# Patient Record
Sex: FEMALE | Race: WHITE | NOT HISPANIC OR LATINO | Employment: FULL TIME | URBAN - METROPOLITAN AREA
[De-identification: names, ages, dates, MRNs, and addresses within clinical notes are randomized per-mention and may not be internally consistent; named-entity substitution may affect disease eponyms.]

---

## 2018-05-14 DIAGNOSIS — E03.9 HYPOTHYROIDISM, UNSPECIFIED TYPE: Primary | ICD-10-CM

## 2018-05-14 RX ORDER — LEVOTHYROXINE SODIUM 137 MCG
TABLET ORAL
Qty: 83 TABLET | Refills: 5 | Status: SHIPPED | OUTPATIENT
Start: 2018-05-14 | End: 2019-06-19 | Stop reason: SDUPTHER

## 2018-06-19 ENCOUNTER — OFFICE VISIT (OUTPATIENT)
Dept: ENDOCRINOLOGY | Facility: HOSPITAL | Age: 48
End: 2018-06-19
Payer: COMMERCIAL

## 2018-06-19 VITALS
DIASTOLIC BLOOD PRESSURE: 70 MMHG | HEART RATE: 70 BPM | HEIGHT: 65 IN | SYSTOLIC BLOOD PRESSURE: 116 MMHG | BODY MASS INDEX: 28.26 KG/M2 | WEIGHT: 169.6 LBS

## 2018-06-19 DIAGNOSIS — E03.8 HYPOTHYROIDISM DUE TO HASHIMOTO'S THYROIDITIS: Primary | ICD-10-CM

## 2018-06-19 DIAGNOSIS — E06.3 HYPOTHYROIDISM DUE TO HASHIMOTO'S THYROIDITIS: Primary | ICD-10-CM

## 2018-06-19 DIAGNOSIS — E04.2 MULTIPLE THYROID NODULES: ICD-10-CM

## 2018-06-19 PROCEDURE — 99203 OFFICE O/P NEW LOW 30 MIN: CPT | Performed by: INTERNAL MEDICINE

## 2018-06-19 NOTE — PATIENT INSTRUCTIONS
Blood work was normal for the thyroid in march 2018  Given increased fatigue, let's repeat blood work now  Call about 1 week after blood work if we don't call you first   Follow up in 1 year with blood work and thyroid ultrasound

## 2018-06-19 NOTE — PROGRESS NOTES
6/19/2018    Assessment/Plan      Diagnoses and all orders for this visit:    Hypothyroidism due to Hashimoto's thyroiditis  -     TSH, 3rd generation Lab Collect  -     T4, free Lab Collect  -     TSH, 3rd generation Lab Collect; Future  -     T4, free Lab Collect; Future  -     Thyroid Antibodies Panel Lab Collect; Future  -     US thyroid; Future    Multiple thyroid nodules  -     TSH, 3rd generation Lab Collect  -     T4, free Lab Collect  -     TSH, 3rd generation Lab Collect; Future  -     T4, free Lab Collect; Future  -     Thyroid Antibodies Panel Lab Collect; Future  -     US thyroid; Future        Assessment/Plan:  1  Hypothyroidism due to Hashimoto's thyroiditis  Blood work done in March did show she was biochemically euthyroid  Unfortunately, she started to have more fatigue and weight struggles  I will recheck a TSH and free T4 now and she will call about a week afterwards for the results at which point we will make adjustments to her thyroid hormone dosage as needed  For now, she will continue the same Synthroid 137 mcg daily  2   Thyroid nodules  She has not had a recent ultrasound and she has had nodules biopsied in the past   I will have her repeat her thyroid ultrasound next year  I have asked her to follow up in 1 year with preceding TSH, free T4, thyroid antibody panel, and thyroid ultrasound  CC: thyroid consult    History of Present Illness     HPI: eSa Murray is a 49y o  year old female with history of hypothyroidism due to hashimoto's thyroiditis  This was diagnosed about 15 years ago and she is on Synthroid brand 137 mcg daily  She has a history of thyroid nodules biopsied several times in the past with the last ultrasound 2 years ago  She tends to be on the cold side  She has no feelings of being hot and sweaty, palpitation, tremors, diarrhea, or constipation  She denies insomnia, anxiety, or depression  She has dry skin and brittle nails, but no hair loss    Weight has reportedly been increasing despite watching portions and exercising  She has noted she is more fatigued recently  Menstrual cycles occur on a regular monthly basis but she is starting to get some hot flashes before her menses  She has no diplopia  She has no difficulties with swallowing or compressive thyroid symptoms  She denies any history of head or neck irradiation  Review of Systems   Constitutional: Positive for fatigue  Negative for unexpected weight change  More fatigue within the last few months  Weight keeps climbing despite diet/portion control and exercise  HENT: Negative for hearing loss, tinnitus and trouble swallowing  No history of XRT to head or neck  Eyes: Negative for visual disturbance  No diplopia  Respiratory: Negative for chest tightness and shortness of breath  Cardiovascular: Negative for chest pain, palpitations and leg swelling  Gastrointestinal: Negative for abdominal pain, constipation, diarrhea and nausea  Endocrine: Positive for cold intolerance  Negative for heat intolerance  Tends to be on the cold side  Genitourinary:        Menses regular on a monthly basis with some heat flashes now  Musculoskeletal: Positive for back pain  Negative for arthralgias  Some low back pain if sits in one position for too long and can get numb and tingling  Skin: Negative for rash  Has dry skin, brittle nails, but no hair loss  Neurological: Positive for numbness  Negative for dizziness, tremors, light-headedness and headaches  Psychiatric/Behavioral: Negative for dysphoric mood and sleep disturbance  The patient is not nervous/anxious          Historical Information   Past Medical History:   Diagnosis Date    Bulging lumbar disc     L4-5    MVP (mitral valve prolapse)      Past Surgical History:   Procedure Laterality Date     SECTION      X 3    KNEE ARTHROTOMY Left     LAPAROSCOPIC CHOLECYSTECTOMY      TONSILLECTOMY       Social History   History   Alcohol Use    Yes     Comment: occasional     History   Drug Use No     History   Smoking Status    Former Smoker    Packs/day: 0 10    Years: 8 00    Types: Cigarettes    Quit date: 1999   Smokeless Tobacco    Never Used     Family History:   Family History   Problem Relation Age of Onset    Hypothyroidism Mother     Stroke Father     No Known Problems Brother     Thyroid disease unspecified Paternal Aunt     No Known Problems Brother     No Known Problems Daughter     No Known Problems Daughter     No Known Problems Son        Meds/Allergies   Current Outpatient Prescriptions   Medication Sig Dispense Refill    SYNTHROID 137 MCG tablet TAKE ONE TABLET BY MOUTH ONCE DAILY 83 tablet 5     No current facility-administered medications for this visit  No Known Allergies    Objective   Vitals: Blood pressure 116/70, pulse 70, height 5' 5" (1 651 m), weight 76 9 kg (169 lb 9 6 oz)  Invasive Devices          No matching active lines, drains, or airways          Physical Exam   Constitutional: She is oriented to person, place, and time  She appears well-developed and well-nourished  HENT:   Head: Normocephalic and atraumatic  Eyes: Conjunctivae and EOM are normal  Pupils are equal, round, and reactive to light  No lid lag, stare, proptosis, or periorbital edema  Neck: Normal range of motion  Neck supple  No thyromegaly present  Thyroid irregular in size with no discrete nodules palpable  No bruits over the thyroid or carotids  Cardiovascular: Normal rate, regular rhythm, normal heart sounds and intact distal pulses  No murmur heard  Pulmonary/Chest: Effort normal and breath sounds normal  She has no wheezes  Abdominal: Soft  Bowel sounds are normal  There is no tenderness  Musculoskeletal: Normal range of motion  She exhibits no edema or deformity  No tremor of the outstretched hands  No spinous process tenderness  No CVAT  Lymphadenopathy:     She has no cervical adenopathy  Neurological: She is alert and oriented to person, place, and time  She has normal reflexes  Skin: Skin is warm and dry  No rash noted  Vitals reviewed  The history was obtained from the review of the chart and from the patient  Lab Results:    Bloodwork done a [facility] [address] on 03/16/2018 showed a TSH of 2 08 with a free T4 of 1 23  No results found for this or any previous visit (from the past 23408 hour(s))        Future Appointments  Date Time Provider Jalyn Thomas   6/19/2019 10:00 AM Hortensia Stoner MD [facility]

## 2018-06-19 NOTE — LETTER
June 19, 2018     [id number] [facility] [address]  [facility] [place] [address]    Patient: Bruce Prajapati   YOB: 1970   Date of Visit: 6/19/2018       Dear Dr Paige Sawn: Thank you for referring Bruce Prajapati to me for evaluation  Below are my notes for this consultation  If you have questions, please do not hesitate to call me  I look forward to following your patient along with you  Sincerely,        Yudi Irvin MD        CC: No Recipients  Yudi Irvin MD  6/19/2018 12:05 PM  Sign at close encounter  6/19/2018    Assessment/Plan      Diagnoses and all orders for this visit:    Hypothyroidism due to Hashimoto's thyroiditis  -     TSH, 3rd generation Lab Collect  -     T4, free Lab Collect  -     TSH, 3rd generation Lab Collect; Future  -     T4, free Lab Collect; Future  -     Thyroid Antibodies Panel Lab Collect; Future  -     US thyroid; Future    Multiple thyroid nodules  -     TSH, 3rd generation Lab Collect  -     T4, free Lab Collect  -     TSH, 3rd generation Lab Collect; Future  -     T4, free Lab Collect; Future  -     Thyroid Antibodies Panel Lab Collect; Future  -     US thyroid; Future        Assessment/Plan:  1  Hypothyroidism due to Hashimoto's thyroiditis  Blood work done in March did show she was biochemically euthyroid  Unfortunately, she started to have more fatigue and weight struggles  I will recheck a TSH and free T4 now and she will call about a week afterwards for the results at which point we will make adjustments to her thyroid hormone dosage as needed  For now, she will continue the same Synthroid 137 mcg daily  2   Thyroid nodules  She has not had a recent ultrasound and she has had nodules biopsied in the past   I will have her repeat her thyroid ultrasound next year  I have asked her to follow up in 1 year with preceding TSH, free T4, thyroid antibody panel, and thyroid ultrasound        CC: thyroid consult    History of Present Illness     HPI: Gary Liu is a 49y o  year old female with history of hypothyroidism due to hashimoto's thyroiditis  This was diagnosed about 15 years ago and she is on Synthroid brand 137 mcg daily  She has a history of thyroid nodules biopsied several times in the past with the last ultrasound 2 years ago  She tends to be on the cold side  She has no feelings of being hot and sweaty, palpitation, tremors, diarrhea, or constipation  She denies insomnia, anxiety, or depression  She has dry skin and brittle nails, but no hair loss  Weight has reportedly been increasing despite watching portions and exercising  She has noted she is more fatigued recently  Menstrual cycles occur on a regular monthly basis but she is starting to get some hot flashes before her menses  She has no diplopia  She has no difficulties with swallowing or compressive thyroid symptoms  She denies any history of head or neck irradiation  Review of Systems   Constitutional: Positive for fatigue  Negative for unexpected weight change  More fatigue within the last few months  Weight keeps climbing despite diet/portion control and exercise  HENT: Negative for hearing loss, tinnitus and trouble swallowing  No history of XRT to head or neck  Eyes: Negative for visual disturbance  No diplopia  Respiratory: Negative for chest tightness and shortness of breath  Cardiovascular: Negative for chest pain, palpitations and leg swelling  Gastrointestinal: Negative for abdominal pain, constipation, diarrhea and nausea  Endocrine: Positive for cold intolerance  Negative for heat intolerance  Tends to be on the cold side  Genitourinary:        Menses regular on a monthly basis with some heat flashes now  Musculoskeletal: Positive for back pain  Negative for arthralgias  Some low back pain if sits in one position for too long and can get numb and tingling  Skin: Negative for rash          Has dry skin, brittle nails, but no hair loss  Neurological: Positive for numbness  Negative for dizziness, tremors, light-headedness and headaches  Psychiatric/Behavioral: Negative for dysphoric mood and sleep disturbance  The patient is not nervous/anxious  Historical Information   Past Medical History:   Diagnosis Date    Bulging lumbar disc     L4-5    MVP (mitral valve prolapse)      Past Surgical History:   Procedure Laterality Date     SECTION      X 3    KNEE ARTHROTOMY Left     LAPAROSCOPIC CHOLECYSTECTOMY      TONSILLECTOMY       Social History   History   Alcohol Use    Yes     Comment: occasional     History   Drug Use No     History   Smoking Status    Former Smoker    Packs/day: 0 10    Years: 8 00    Types: Cigarettes    Quit date:    Smokeless Tobacco    Never Used     Family History:   Family History   Problem Relation Age of Onset    Hypothyroidism Mother     Stroke Father     No Known Problems Brother     Thyroid disease unspecified Paternal Aunt     No Known Problems Brother     No Known Problems Daughter     No Known Problems Daughter     No Known Problems Son        Meds/Allergies   Current Outpatient Prescriptions   Medication Sig Dispense Refill    SYNTHROID 137 MCG tablet TAKE ONE TABLET BY MOUTH ONCE DAILY 83 tablet 5     No current facility-administered medications for this visit  No Known Allergies    Objective   Vitals: Blood pressure 116/70, pulse 70, height 5' 5" (1 651 m), weight 76 9 kg (169 lb 9 6 oz)  Invasive Devices          No matching active lines, drains, or airways          Physical Exam   Constitutional: She is oriented to person, place, and time  She appears well-developed and well-nourished  HENT:   Head: Normocephalic and atraumatic  Eyes: Conjunctivae and EOM are normal  Pupils are equal, round, and reactive to light  No lid lag, stare, proptosis, or periorbital edema  Neck: Normal range of motion  Neck supple  No thyromegaly present  Thyroid irregular in size with no discrete nodules palpable  No bruits over the thyroid or carotids  Cardiovascular: Normal rate, regular rhythm, normal heart sounds and intact distal pulses  No murmur heard  Pulmonary/Chest: Effort normal and breath sounds normal  She has no wheezes  Abdominal: Soft  Bowel sounds are normal  There is no tenderness  Musculoskeletal: Normal range of motion  She exhibits no edema or deformity  No tremor of the outstretched hands  No spinous process tenderness  No CVAT  Lymphadenopathy:     She has no cervical adenopathy  Neurological: She is alert and oriented to person, place, and time  She has normal reflexes  Skin: Skin is warm and dry  No rash noted  Vitals reviewed  The history was obtained from the review of the chart and from the patient  Lab Results:    Bloodwork done a [address] [place] [address] on 03/16/2018 showed a TSH of 2 08 with a free T4 of 1 23  No results found for this or any previous visit (from the past 54433 hour(s))        Future Appointments  Date Time Provider Jalyn Thomas   6/19/2019 10:00 AM Bret Morgan MD [facility]

## 2019-06-19 ENCOUNTER — OFFICE VISIT (OUTPATIENT)
Dept: ENDOCRINOLOGY | Facility: HOSPITAL | Age: 49
End: 2019-06-19
Payer: COMMERCIAL

## 2019-06-19 VITALS
BODY MASS INDEX: 25.56 KG/M2 | DIASTOLIC BLOOD PRESSURE: 74 MMHG | HEIGHT: 65 IN | SYSTOLIC BLOOD PRESSURE: 102 MMHG | WEIGHT: 153.4 LBS | HEART RATE: 90 BPM

## 2019-06-19 DIAGNOSIS — E03.8 HYPOTHYROIDISM DUE TO HASHIMOTO'S THYROIDITIS: Primary | ICD-10-CM

## 2019-06-19 DIAGNOSIS — E03.9 HYPOTHYROIDISM, UNSPECIFIED TYPE: ICD-10-CM

## 2019-06-19 DIAGNOSIS — E06.3 HYPOTHYROIDISM DUE TO HASHIMOTO'S THYROIDITIS: Primary | ICD-10-CM

## 2019-06-19 DIAGNOSIS — E04.2 MULTIPLE THYROID NODULES: ICD-10-CM

## 2019-06-19 PROCEDURE — 99213 OFFICE O/P EST LOW 20 MIN: CPT | Performed by: INTERNAL MEDICINE

## 2019-06-19 RX ORDER — LEVOTHYROXINE SODIUM 137 MCG
137 TABLET ORAL DAILY
Qty: 90 TABLET | Refills: 3 | Status: SHIPPED | OUTPATIENT
Start: 2019-06-19 | End: 2020-06-15 | Stop reason: SDUPTHER

## 2019-07-28 DIAGNOSIS — E03.9 HYPOTHYROIDISM, UNSPECIFIED TYPE: ICD-10-CM

## 2019-07-29 RX ORDER — LEVOTHYROXINE SODIUM 137 MCG
TABLET ORAL
Qty: 83 TABLET | Refills: 5 | Status: SHIPPED | OUTPATIENT
Start: 2019-07-29 | End: 2020-06-15 | Stop reason: SDUPTHER

## 2020-06-11 LAB
T4 FREE SERPL-MCNC: 1.7 NG/DL (ref 0.8–1.8)
THYROGLOB AB SERPL-ACNC: <1 IU/ML
THYROPEROXIDASE AB SERPL-ACNC: 73 IU/ML
TSH SERPL-ACNC: 0.05 MIU/L

## 2020-06-12 ENCOUNTER — TELEPHONE (OUTPATIENT)
Dept: ENDOCRINOLOGY | Facility: HOSPITAL | Age: 50
End: 2020-06-12

## 2020-06-15 ENCOUNTER — TELEPHONE (OUTPATIENT)
Dept: ENDOCRINOLOGY | Facility: HOSPITAL | Age: 50
End: 2020-06-15

## 2020-06-15 ENCOUNTER — OFFICE VISIT (OUTPATIENT)
Dept: ENDOCRINOLOGY | Facility: HOSPITAL | Age: 50
End: 2020-06-15
Payer: COMMERCIAL

## 2020-06-15 VITALS
HEIGHT: 65 IN | WEIGHT: 158.4 LBS | SYSTOLIC BLOOD PRESSURE: 118 MMHG | BODY MASS INDEX: 26.39 KG/M2 | HEART RATE: 64 BPM | DIASTOLIC BLOOD PRESSURE: 80 MMHG | TEMPERATURE: 97.9 F

## 2020-06-15 DIAGNOSIS — E04.2 MULTIPLE THYROID NODULES: ICD-10-CM

## 2020-06-15 DIAGNOSIS — E03.8 HYPOTHYROIDISM DUE TO HASHIMOTO'S THYROIDITIS: Primary | ICD-10-CM

## 2020-06-15 DIAGNOSIS — E03.9 HYPOTHYROIDISM, UNSPECIFIED TYPE: ICD-10-CM

## 2020-06-15 DIAGNOSIS — E06.3 HYPOTHYROIDISM DUE TO HASHIMOTO'S THYROIDITIS: Primary | ICD-10-CM

## 2020-06-15 PROCEDURE — 99213 OFFICE O/P EST LOW 20 MIN: CPT | Performed by: INTERNAL MEDICINE

## 2020-06-15 RX ORDER — MELATONIN
1000 DAILY
COMMUNITY
End: 2022-06-23 | Stop reason: ALTCHOICE

## 2020-06-15 RX ORDER — MULTIVIT WITH MINERALS/LUTEIN
1000 TABLET ORAL DAILY
COMMUNITY
End: 2022-06-23 | Stop reason: ALTCHOICE

## 2020-06-15 RX ORDER — LEVOTHYROXINE SODIUM 137 MCG
TABLET ORAL
Qty: 90 TABLET | Refills: 3 | Status: SHIPPED | OUTPATIENT
Start: 2020-06-15 | End: 2021-06-21 | Stop reason: SDUPTHER

## 2021-03-02 ENCOUNTER — TELEPHONE (OUTPATIENT)
Dept: ENDOCRINOLOGY | Facility: HOSPITAL | Age: 51
End: 2021-03-02

## 2021-03-02 NOTE — TELEPHONE ENCOUNTER
That is too much thyroid hormone so I would have her decrease her Synthroid to 137 mcg 1 tablet 6 days a week and none on that 7 day so perhaps skip her Sunday dosage  Will repeat her TSH and free T4 before her appointment in June 2021

## 2021-06-21 ENCOUNTER — OFFICE VISIT (OUTPATIENT)
Dept: ENDOCRINOLOGY | Facility: HOSPITAL | Age: 51
End: 2021-06-21
Payer: COMMERCIAL

## 2021-06-21 VITALS
SYSTOLIC BLOOD PRESSURE: 130 MMHG | WEIGHT: 157.8 LBS | HEART RATE: 72 BPM | DIASTOLIC BLOOD PRESSURE: 80 MMHG | HEIGHT: 65 IN | BODY MASS INDEX: 26.29 KG/M2

## 2021-06-21 DIAGNOSIS — E04.2 MULTIPLE THYROID NODULES: ICD-10-CM

## 2021-06-21 DIAGNOSIS — E06.3 HYPOTHYROIDISM DUE TO HASHIMOTO'S THYROIDITIS: Primary | ICD-10-CM

## 2021-06-21 DIAGNOSIS — E03.9 HYPOTHYROIDISM, UNSPECIFIED TYPE: ICD-10-CM

## 2021-06-21 DIAGNOSIS — Z12.11 ENCOUNTER FOR SCREENING COLONOSCOPY: ICD-10-CM

## 2021-06-21 DIAGNOSIS — E03.8 HYPOTHYROIDISM DUE TO HASHIMOTO'S THYROIDITIS: Primary | ICD-10-CM

## 2021-06-21 PROCEDURE — 99214 OFFICE O/P EST MOD 30 MIN: CPT | Performed by: INTERNAL MEDICINE

## 2021-06-21 RX ORDER — LEVOTHYROXINE SODIUM 137 MCG
TABLET ORAL
Qty: 90 TABLET | Refills: 3 | Status: SHIPPED | OUTPATIENT
Start: 2021-06-21 | End: 2022-06-23 | Stop reason: SDUPTHER

## 2021-06-21 NOTE — PATIENT INSTRUCTIONS
The thyroid blood work is normal    Continue the same synthroid dosage  The thyroid ultrasound Shows a new small nodule in the left  we'll follow with ultrasound next year  Follow up in 1 year with blood work and thyroid ultrasound

## 2021-06-21 NOTE — PROGRESS NOTES
6/21/2021    Assessment/Plan      Diagnoses and all orders for this visit:    Hypothyroidism due to Hashimoto's thyroiditis  -     TSH, 3rd generation Lab Collect; Future  -     T4, free Lab Collect; Future  -     US thyroid; Future    Encounter for screening colonoscopy  -     Ambulatory referral to Gastroenterology; Future    Multiple thyroid nodules  -     TSH, 3rd generation Lab Collect; Future  -     T4, free Lab Collect; Future  -     US thyroid; Future    Hypothyroidism, unspecified type  -     Synthroid 137 MCG tablet; 1 Tablet 6 days a week and no tablet 1 day a week        Assessment/Plan:    1  Hypothyroidism due to Hashimoto's thyroiditis  Most recent thyroid function tests are normal   For now, she will continue the same Synthroid brand 137 mcg 1 tablet 6 days a week and none 1 day a week  2  Multiple thyroid nodules  Her most recent thyroid ultrasound does show bilateral thyroid nodules with a new 1 on the left  Observation is the treatment of choice  She is having no compressive thyroid symptoms  For now, I will follow her over time and repeat her thyroid ultrasound next year since there is a new left-sided thyroid nodule  I have asked her to follow up in 1 year with preceding TSH, free T4, and thyroid ultrasound  CC:   Hypothyroid and thyroid nodule follow-up    History of Present Illness     HPI: Simon Lee is a 45y o  year old female with history of Hypothyroidism due to Hashimoto's thyroiditis with multiple thyroid nodules for follow-up visit  She was diagnosed with hypothyroidism about 17 years ago and was placed on thyroid hormone  She is currently taking Synthroid 137 mcg 1 tablet 6 days a week and none 1 day a week  She has been getting some hot flashes and is a bit more hotter than usual   Menstrual cycles are much more sporadic occurring anywhere from every 2 weeks to every month  She has unchanged dry skin and unchanged brittle nails but no hair loss    She does wake up frequently  She denies fatigue, weight changes, palpitation, tremors, cold intolerance, anxiety or depression, diarrhea or constipation  She denies diplopia  She has a history of thyroid nodules which were biopsied several times in the past the last ultrasound was 5 years ago  This did demonstrate stable size thyroid nodules  She has no compressive thyroid symptoms or difficulties with swallowing  Review of Systems   Constitutional: Negative for fatigue and unexpected weight change  HENT: Negative for trouble swallowing  Eyes: Negative for visual disturbance  No diplopia  Respiratory: Negative for chest tightness and shortness of breath  Cardiovascular: Negative for chest pain and palpitations  Gastrointestinal: Negative for abdominal pain, constipation, diarrhea and nausea  Endocrine: Negative for cold intolerance and heat intolerance  Getting some hot flashes and hotter more  Genitourinary:        Menses now more sporadic, every 2 weeks or every month  Skin: Negative for rash  Has dry skin unchanged  Has brittle nails  Unchanged  No hair loss  Neurological: Negative for dizziness, tremors, light-headedness and headaches  Psychiatric/Behavioral: Positive for sleep disturbance  Negative for dysphoric mood  The patient is not nervous/anxious  Wakes frequently         Historical Information   Past Medical History:   Diagnosis Date    Bulging lumbar disc     L4-5    MVP (mitral valve prolapse)      Past Surgical History:   Procedure Laterality Date     SECTION      X 3    KNEE ARTHROTOMY Left     LAPAROSCOPIC CHOLECYSTECTOMY      TONSILLECTOMY       Social History   Social History     Substance and Sexual Activity   Alcohol Use Yes    Comment: occasional     Social History     Substance and Sexual Activity   Drug Use No     Social History     Tobacco Use   Smoking Status Former Smoker    Packs/day: 0 10    Years: 8 00    Pack years: 0 80    Types: Cigarettes    Quit date: Aldair Saxena Years since quittin 4   Smokeless Tobacco Never Used     Family History:   Family History   Problem Relation Age of Onset    Hypothyroidism Mother     Vascular Disease Mother         carotid artery surgery    Stroke Father     No Known Problems Brother     Thyroid disease unspecified Paternal Aunt     No Known Problems Brother     No Known Problems Daughter     No Known Problems Daughter     No Known Problems Son        Meds/Allergies   Current Outpatient Medications   Medication Sig Dispense Refill    Ascorbic Acid (VITAMIN C) 1000 MG tablet Take 1,000 mg by mouth daily      cholecalciferol (VITAMIN D3) 1,000 units tablet Take 1,000 Units by mouth daily      Multiple Vitamins-Minerals (MULTIVITAMIN WOMEN 50+) TABS Take by mouth daily      Synthroid 137 MCG tablet 1 Tablet 6 days a week and no tablet 1 day a week 90 tablet 3     No current facility-administered medications for this visit  No Known Allergies    Objective   Vitals: Blood pressure 130/80, pulse 72, height 5' 5" (1 651 m), weight 71 6 kg (157 lb 12 8 oz)  Invasive Devices     None                 Physical Exam  Vitals reviewed  Constitutional:       Appearance: Normal appearance  She is well-developed  HENT:      Head: Normocephalic and atraumatic  Eyes:      Extraocular Movements: Extraocular movements intact  Conjunctiva/sclera: Conjunctivae normal       Comments: No lid lag, stare, proptosis, or periorbital edema  Neck:      Thyroid: No thyromegaly  Vascular: No carotid bruit  Comments: Thyroid irregular in feel without discrete nodules palpable  No bruits over the thyroid gland  Cardiovascular:      Rate and Rhythm: Normal rate and regular rhythm  Heart sounds: Normal heart sounds  No murmur heard  Pulmonary:      Effort: Pulmonary effort is normal       Breath sounds: Normal breath sounds  No wheezing     Abdominal:      Palpations: Abdomen is soft  Musculoskeletal:         General: No deformity  Normal range of motion  Cervical back: Normal range of motion and neck supple  Right lower leg: No edema  Left lower leg: No edema  Comments: No tremor of the outstretched hands  Lymphadenopathy:      Cervical: No cervical adenopathy  Skin:     General: Skin is warm and dry  Findings: No rash  Neurological:      Mental Status: She is alert and oriented to person, place, and time  Deep Tendon Reflexes: Reflexes are normal and symmetric  Comments: Deep tendon reflexes normal          The history was obtained from the review of the chart and from the patient  Lab Results:        Blood work done on 06/18/2021 at [facility] showed a TSH of 0 381 with a free T4 of 1 65  Thyroid ultrasound:    Thyroid ultrasound performed at [facility] on 06/18/2021 showed a right lobe of the thyroid measuring 0 8 x 2 6 x 0 6 cm and the right lobe measures 1 4 x 4 1 x 1 2 cm  There is a mid pole right thyroid nodule measuring 0 6 cm which used to measure 0 7 cm and is T rads 4  There is a new posterior lower pole left thyroid nodule hypoechoic, solid measuring 1 cm which is T rads 4       Future Appointments   Date Time Provider Jalyn Thomas   6/23/2022 10:00 AM Gladys Lockhart MD [facility]

## 2022-06-23 ENCOUNTER — OFFICE VISIT (OUTPATIENT)
Dept: ENDOCRINOLOGY | Facility: HOSPITAL | Age: 52
End: 2022-06-23
Payer: COMMERCIAL

## 2022-06-23 VITALS
HEIGHT: 65 IN | HEART RATE: 70 BPM | DIASTOLIC BLOOD PRESSURE: 78 MMHG | BODY MASS INDEX: 26.86 KG/M2 | SYSTOLIC BLOOD PRESSURE: 118 MMHG | WEIGHT: 161.2 LBS

## 2022-06-23 DIAGNOSIS — E03.8 HYPOTHYROIDISM DUE TO HASHIMOTO'S THYROIDITIS: Primary | ICD-10-CM

## 2022-06-23 DIAGNOSIS — E03.9 HYPOTHYROIDISM, UNSPECIFIED TYPE: ICD-10-CM

## 2022-06-23 DIAGNOSIS — E06.3 HYPOTHYROIDISM DUE TO HASHIMOTO'S THYROIDITIS: Primary | ICD-10-CM

## 2022-06-23 DIAGNOSIS — E04.2 MULTIPLE THYROID NODULES: ICD-10-CM

## 2022-06-23 PROCEDURE — 99214 OFFICE O/P EST MOD 30 MIN: CPT | Performed by: INTERNAL MEDICINE

## 2022-06-23 RX ORDER — LEVOTHYROXINE SODIUM 137 MCG
TABLET ORAL
Qty: 90 TABLET | Refills: 3 | Status: SHIPPED | OUTPATIENT
Start: 2022-06-23

## 2022-06-23 RX ORDER — LOSARTAN POTASSIUM 25 MG/1
TABLET ORAL
COMMUNITY
Start: 2022-06-03

## 2022-06-23 NOTE — PATIENT INSTRUCTIONS
The thyroid blood work is normal     Continue the same synthroid dosage  They thyroid ultrasound shows all thyroid nodules have resolved  Follow up in 1 year with blood work

## 2022-06-23 NOTE — PROGRESS NOTES
6/23/2022    Assessment/Plan      Diagnoses and all orders for this visit:    Hypothyroidism due to Hashimoto's thyroiditis  -     TSH, 3rd generation Lab Collect; Future  -     T4, free Lab Collect; Future    Multiple thyroid nodules  -     TSH, 3rd generation Lab Collect; Future  -     T4, free Lab Collect; Future    Hypothyroidism, unspecified type  -     Synthroid 137 MCG tablet; 1 Tablet 6 days a week and no tablet 1 day a week    Other orders  -     losartan (COZAAR) 25 mg tablet; TAKE 1/2 TABLET (12 5MG) BY MOUTH EVERY DAY        Assessment/Plan:  1  Hypothyroidism due to Hashimoto's thyroiditis  Most recent thyroid function tests are normal   She is biochemically and clinically euthyroid  She will continue the same Synthroid 137 mcg 1 tablet 6 days a week and then 1 day a week  2  Multiple thyroid nodules  Her most recent thyroid ultrasound does show that her thyroid nodules have resolved  She just has a heterogeneous echotexture consistent with Hashimoto's thyroiditis  At this point, unless something changes, no repeat thyroid ultrasound needs to be performed  I have asked her to follow up in 1 year with preceding TSH and free T4       CC:  Hypothyroid and thyroid nodule follow up    History of Present Illness     HPI: Lyric Cross is a 45y o  year old female with history of hypothyroidism due to Hashimoto's thyroiditis with multiple thyroid nodules for follow-up visit  She was diagnosed with hypothyroidism about 17 years ago and was placed on thyroid hormone  She is on thyroid hormone for replacement purposes and currently takes Synthroid brand 137 mcg 1 tablet 6 days a week and none on 1 day a week  Weight is 4 lb more than last year  Weight continues to go up even though she is more active and eating less  She always tends to be on the colder side  She has chronic dry skin but no brittle nails or hair loss    She has significant work stress with some episodes of anxiety and some variable sleep  She denies fatigue, heat intolerance, palpitation, tremors, anxiety or depression, diarrhea or constipation  She denies diplopia  Menstrual cycles continue to occur on a regular monthly basis  She is a history of thyroid nodules which were biopsied several times in the past and her last ultrasound was last year with a new left-sided thyroid nodule in the setting of bilateral nodules that were previously stable  Observation was the treatment of choice and she is for repeat thyroid ultrasound this year  She denies compressive thyroid symptoms or difficulties with swallowing  Review of Systems   Constitutional: Positive for unexpected weight change  Negative for fatigue  Weight 4 lbs more than last visit  Weight going up as more active, eating less  HENT: Negative for trouble swallowing  Eyes: Negative for visual disturbance  No diplopia  Respiratory: Negative for chest tightness and shortness of breath  Cardiovascular: Negative for chest pain and palpitations  Gastrointestinal: Negative for abdominal pain, constipation, diarrhea and nausea  Endocrine: Positive for cold intolerance  Negative for heat intolerance  Tends to be on the colder side  Genitourinary:        Menses regular on a monthly basis  Skin: Negative for rash  Has chronic dry skin  No brittle nails  No hair loss  Neurological: Positive for light-headedness  Negative for dizziness, tremors and headaches  Occasional lightheaded at times  Psychiatric/Behavioral: Positive for sleep disturbance  Negative for dysphoric mood  The patient is nervous/anxious  Significant work stress, some episodes of anxiety  Sleeping variable          Historical Information   Past Medical History:   Diagnosis Date    Bulging lumbar disc     L4-5    MVP (mitral valve prolapse)      Past Surgical History:   Procedure Laterality Date     SECTION      X 3    KNEE ARTHROTOMY Left     LAPAROSCOPIC CHOLECYSTECTOMY      TONSILLECTOMY       Social History   Social History     Substance and Sexual Activity   Alcohol Use Yes    Comment: occasional     Social History     Substance and Sexual Activity   Drug Use No     Social History     Tobacco Use   Smoking Status Former Smoker    Packs/day: 0 10    Years: 8 00    Pack years: 0 80    Types: Cigarettes    Quit date: 35    Years since quittin 4   Smokeless Tobacco Never Used     Family History:   Family History   Problem Relation Age of Onset    Hypothyroidism Mother     Vascular Disease Mother         carotid artery surgery    Stroke Father     No Known Problems Brother     Thyroid disease unspecified Paternal Aunt     No Known Problems Brother     No Known Problems Daughter     No Known Problems Daughter     No Known Problems Son        Meds/Allergies   Current Outpatient Medications   Medication Sig Dispense Refill    losartan (COZAAR) 25 mg tablet TAKE 1/2 TABLET (12 5MG) BY MOUTH EVERY DAY      Multiple Vitamins-Minerals (MULTIVITAMIN WOMEN 50+) TABS Take by mouth daily      Synthroid 137 MCG tablet 1 Tablet 6 days a week and no tablet 1 day a week 90 tablet 3     No current facility-administered medications for this visit  No Known Allergies    Objective   Vitals: Blood pressure 118/78, pulse 70, height 5' 5" (1 651 m), weight 73 1 kg (161 lb 3 2 oz)  Invasive Devices  Report    None                 Physical Exam  Vitals reviewed  Constitutional:       Appearance: Normal appearance  She is well-developed  HENT:      Head: Normocephalic and atraumatic  Eyes:      Extraocular Movements: Extraocular movements intact  Conjunctiva/sclera: Conjunctivae normal       Comments: No lid lag, stare, proptosis, or periorbital edema  Neck:      Thyroid: No thyromegaly  Vascular: No carotid bruit  Comments: Thyroid normal in size  No palpable thyroid nodules    Cardiovascular:      Rate and Rhythm: Normal rate and regular rhythm  Heart sounds: Normal heart sounds  No murmur heard  Pulmonary:      Effort: Pulmonary effort is normal       Breath sounds: Normal breath sounds  No wheezing  Abdominal:      Palpations: Abdomen is soft  Musculoskeletal:         General: No deformity  Normal range of motion  Cervical back: Normal range of motion and neck supple  Right lower leg: No edema  Left lower leg: No edema  Comments: No tremor of the outstretched hands  Lymphadenopathy:      Cervical: No cervical adenopathy  Skin:     General: Skin is warm and dry  Findings: No rash  Neurological:      Mental Status: She is alert and oriented to person, place, and time  Deep Tendon Reflexes: Reflexes are normal and symmetric  Comments: Patellar deep tendon reflexes normal          The history was obtained from the review of the chart and from the patient  Lab Results:      Blood work done on 06/15/2022 at [facility] showed a TSH of 2 37 with a free T4 of 1 06  Thyroid ultrasound:    Thyroid ultrasound performed on 06/15/2022 at [facility] showed a right lobe of the thyroid measuring 2 5 x 0 7 x 0 7 cm and a left lobe of the thyroid measuring 3 2 x 1 1 x 1 1 cm  The previous ultrasound nodules noted in the past have since resolved  The entire echogenicity is just heterogeneous        Future Appointments   Date Time Provider Jalyn Thomas   6/23/2023 10:00 AM Kellie Loaiza MD [facility]

## 2023-05-19 ENCOUNTER — OFFICE VISIT (OUTPATIENT)
Dept: OBGYN CLINIC | Facility: CLINIC | Age: 53
End: 2023-05-19

## 2023-05-19 VITALS
SYSTOLIC BLOOD PRESSURE: 114 MMHG | WEIGHT: 164.4 LBS | HEIGHT: 65 IN | BODY MASS INDEX: 27.39 KG/M2 | DIASTOLIC BLOOD PRESSURE: 60 MMHG

## 2023-05-19 DIAGNOSIS — N92.6 IRREGULAR BLEEDING: ICD-10-CM

## 2023-05-19 DIAGNOSIS — R61 NIGHT SWEATS: ICD-10-CM

## 2023-05-19 DIAGNOSIS — R63.5 WEIGHT GAIN: ICD-10-CM

## 2023-05-19 DIAGNOSIS — N95.1 PERIMENOPAUSE: Primary | ICD-10-CM

## 2023-05-19 NOTE — PROGRESS NOTES
[id number]   [address], [address], [facility], [place]    Assessment/Plan:  1  Perimenopause  A/P:   Reviewed menopause by definition is 12 months w/o menses, average age is 52yo  Prior to that many changes in menses can occur - including change in frequency, change in amount, change in length  Menopausal symptoms can also occur during this time  Symptoms she is describing are c/w perimenopause and this is common at her age  2  Irregular bleeding  Comments: In last 6 months periods more irregular  Assessment & Plan:  Bleeding becoming more variable with cycles  Reviewed what patterns can be normal perimenopausal and we should consider evaluation with u/s and biopsy if bleeding > 10-12 days, or more frequent than every 21 days between periods  It sounds as if she did have one cycle about 18 days, but since has lengthened again and last 28 days  Reviewed if interfering with life due to frequency or unpredictability of bleeding - once malignancy is ruled out we could try hormonal manipulation  At this point she will continue to monitor  3  Weight gain  Comments:  weight gain despite exercise and diet changes  12 lbs in last 5-6 months per patient  A/P:   Unfortunately weight gain or inability to lose weight in perimenopause and menopause is common due to metabolism changes  Encouraged to consider nutritional counseling  Referral given  Orders:  -     Ambulatory Referral to Nutrition Services; Future    4  Night sweats  Comments:  intermittently in last few months  Assessment & Plan:  Sounds consistent with perimenopausal symptoms  Discussed environmental changes while sleeping to decrease affects of hot flashes/night sweats - she has tried this  Discussed herbal and over the counter options - including soy/isoflavones, black cohosh, Evening Primrose Oil  Gave patient handout w/ options  She is interested in trying    Briefly reviewed if symptoms continue alternatives could include SSRIs, gabapentin, or HRT  Briefly reviewed pros/cons  Reassurance given these usually improve spontaneously with time  I have spent a total time of 40 minutes on 23 in caring for this patient including Patient education, Impressions, Counseling / Coordination of care, Documenting in the medical record, Reviewing / ordering tests, medicine, procedures   and Obtaining or reviewing history    Next appt; Wellness overdue - schedule and will f/u on symptoms  Subjective:   Evita Hernandez is a 47 y o   female  CC: My cycles are all over the place      HPI:   Arley Jara presents today to discuss changes in her periods, night sweat, weight gain and questions about menopause  Arley Jara was last seen with us 2020 for a wellness visit  At that time she was having regular periods - about 22-24 days apart without issue  Her weight per chart was 155lbs  She states about 5-6 months ago interval between periods started getting more variable  Sometimes shorter, sometimes longer  Once or twice the interval was about 18 days but more recently it was up to 28 days  Length of bleeding usually 3-7 days but one up to 10 days  She feels has episodes of night sweats at times  Mood irritability, as well as has been gaining weight unexpectedly  She  States has gained 12 lbs in last 6 months  She has been exercising normally and paying closer attention to portions and what she eats and continues to gain weight  She is hypothyroid on medication and her TSH was normal with endo in 2022 ([facility]) and she reports more recently her cardiologist checked it due to weight gain and it was normal as well  She is seeing endocrinology next month  Gyn History  Patient's last menstrual period was 2023 (exact date)  Last pap smear: 2018    She  reports being sexually active and has had partner(s) who are male   She reports using the following method of "birth control/protection: Male Sterilization  OB History      Past Medical History:  No date: Bulging lumbar disc      Comment:  L4-5  : Hypertension      Comment:  seeing cardiology  No date: Hypothyroidism      Comment:  seeing Endocrinology, Dr Maria E Rae  No date: MVP (mitral valve prolapse)     Past Surgical History:  No date:  SECTION      Comment:  X 3 (, , & )  2005: KNEE ARTHROTOMY; Left  : LAPAROSCOPIC CHOLECYSTECTOMY  2020: MAMMO (HISTORICAL); Bilateral      Comment:  [facility] BI-RADS 1  Negative   Heterogenously dense; 50% to                75% glandular breast tissue  : TONSILLECTOMY     Social History     Tobacco Use   • Smoking status: Former     Packs/day: 0 10     Years: 8 00     Pack years: 0 80     Types: Cigarettes     Quit date: 1999     Years since quittin 3   • Smokeless tobacco: Never   Vaping Use   • Vaping Use: Never used   Substance Use Topics   • Alcohol use: Yes     Alcohol/week: 3 0 standard drinks     Types: 3 Standard drinks or equivalent per week     Comment: Socially - not every week   • Drug use: No          Current Outpatient Medications:   •  losartan (COZAAR) 25 mg tablet, TAKE 1/2 TABLET (12 5MG) BY MOUTH EVERY DAY, Disp: , Rfl:   •  Multiple Vitamins-Minerals (MULTIVITAMIN WOMEN 50+) TABS, Take by mouth daily, Disp: , Rfl:   •  Synthroid 137 MCG tablet, 1 Tablet 6 days a week and no tablet 1 day a week, Disp: 90 tablet, Rfl: 3    She has No Known Allergies       ROS: Review of Systems   Constitutional: Negative  Respiratory: Negative  Cardiovascular: Negative  Gastrointestinal: Negative  Endocrine: Positive for heat intolerance (night sweats occasionally)  Genitourinary: Positive for menstrual problem (period irregularity)         Objective:  /60 (BP Location: Left arm, Patient Position: Sitting, Cuff Size: Large)   Ht 5' 5 25\" (1 657 m)   Wt 74 6 kg (164 lb 6 4 oz)   LMP 2023 (Exact Date)   BMI " 27 15 kg/m²      Physical Exam  Constitutional:       Appearance: Normal appearance  Neurological:      Mental Status: She is alert and oriented to person, place, and time     Psychiatric:         Behavior: Behavior normal

## 2023-05-19 NOTE — ASSESSMENT & PLAN NOTE
Sounds consistent with perimenopausal symptoms  Discussed environmental changes while sleeping to decrease affects of hot flashes/night sweats - she has tried this  Discussed herbal and over the counter options - including soy/isoflavones, black cohosh, Evening Primrose Oil  Gave patient handout w/ options  She is interested in trying  Briefly reviewed if symptoms continue alternatives could include SSRIs, gabapentin, or HRT  Briefly reviewed pros/cons  Reassurance given these usually improve spontaneously with time

## 2023-05-19 NOTE — PATIENT INSTRUCTIONS
Perimenopause:   - menopause by definition is 12 months w/o menses, average age is 52yo, perimenopause is the time before periods completely stop  - period changes are normal before menopause and can happen for a couple years before they stop  - normal changes can occur in frequency, amount and length of bleeding   - call if periods last longer than 10-12 days or more frequently than every 21 days (from start of one period to the start of the next)   - Menopausal symptoms can also occur during this time       Non-Hormonal Treatments for Menopausal Symptoms:  Hot Flashes, Night Sweats, Irritability    Behavior modification  - layers, wicking clothes, fans, lower temp  - Paced Breathing Technique (search for “paced breathing” online, apps also  available for your smartphone)  - 2-4x/day for 15-20 min and with onset of hot flash  - acupuncture therapy    Herbals - most take a trial of 12 weeks for response, most common side effect is upset                                stomach    - Estroven (commercial line of menopausal supplements)   - contains Black Cohosh, Soy isoflavones and other supplements   - have a range of symptom specific products    - Black Cohosh (North American plant root)   - commercially available as Remifemin   - 1 tab AM, 1 tab PM (20mg/tab)    - Evening Primrose Oil (American wildflower)   - 3-4mg daily with food    - Isoflavones found in dietary soy - avoid if personal history of Breast Cancer  - diet: 25gr soy/day   - equivalent to 1/2 cup dried soy beans, 1 cup tofu,   or 1/3 cup soy protein  - supplement over the counter: 50-120mg/day

## 2023-05-19 NOTE — ASSESSMENT & PLAN NOTE
Bleeding becoming more variable with cycles  Reviewed what patterns can be normal perimenopausal and we should consider evaluation with u/s and biopsy if bleeding > 10-12 days, or more frequent than every 21 days between periods  It sounds as if she did have one cycle about 18 days, but since has lengthened again and last 28 days  Reviewed if interfering with life due to frequency or unpredictability of bleeding - once malignancy is ruled out we could try hormonal manipulation  At this point she will continue to monitor

## 2023-06-19 DIAGNOSIS — E03.9 HYPOTHYROIDISM, UNSPECIFIED TYPE: ICD-10-CM

## 2023-06-19 RX ORDER — LEVOTHYROXINE SODIUM 137 UG/1
TABLET ORAL
Qty: 78 TABLET | Refills: 3 | Status: SHIPPED | OUTPATIENT
Start: 2023-06-19

## 2023-07-04 LAB
T4 FREE SERPL-MCNC: 1.49 NG/DL (ref 0.82–1.77)
TSH SERPL DL<=0.005 MIU/L-ACNC: 0.72 UIU/ML (ref 0.45–4.5)

## 2023-07-10 ENCOUNTER — OFFICE VISIT (OUTPATIENT)
Dept: ENDOCRINOLOGY | Facility: HOSPITAL | Age: 53
End: 2023-07-10
Payer: COMMERCIAL

## 2023-07-10 VITALS
WEIGHT: 163 LBS | SYSTOLIC BLOOD PRESSURE: 138 MMHG | HEART RATE: 78 BPM | BODY MASS INDEX: 27.16 KG/M2 | DIASTOLIC BLOOD PRESSURE: 82 MMHG | HEIGHT: 65 IN

## 2023-07-10 DIAGNOSIS — E06.3 HYPOTHYROIDISM DUE TO HASHIMOTO'S THYROIDITIS: Primary | ICD-10-CM

## 2023-07-10 DIAGNOSIS — E04.2 MULTIPLE THYROID NODULES: ICD-10-CM

## 2023-07-10 DIAGNOSIS — E03.8 HYPOTHYROIDISM DUE TO HASHIMOTO'S THYROIDITIS: Primary | ICD-10-CM

## 2023-07-10 PROCEDURE — 99213 OFFICE O/P EST LOW 20 MIN: CPT | Performed by: INTERNAL MEDICINE

## 2023-07-10 NOTE — PROGRESS NOTES
7/11/2023    Assessment/Plan      Diagnoses and all orders for this visit:    Hypothyroidism due to Hashimoto's thyroiditis  -     TSH, 3rd generation Lab Collect; Future  -     T4, free Lab Collect; Future  -     TSH, 3rd generation Lab Collect  -     T4, free Lab Collect    Multiple thyroid nodules  -     TSH, 3rd generation Lab Collect; Future  -     T4, free Lab Collect; Future  -     TSH, 3rd generation Lab Collect  -     T4, free Lab Collect        Assessment/Plan:  1. Hypothyroidism due to Hashimoto's thyroiditis. Most recent thyroid function studies are normal.  She is biochemically euthyroid. She will continue the same levothyroxine 137 mcg 1 tablet 6 days a week and none on Sunday. 2.  Thyroid nodules. Her last ultrasound showed resolution of thyroid nodules. If there was no change in symptoms, we do not need to repeat an ultrasound at this time. I have asked her to follow-up in 1 year with preceding TSH and free T4.      CC: Hypothyroid and thyroid nodule follow-up    History of Present Illness     HPI: Kia Lindsay is a 47y.o. year old female with history of hypothyroidism due to Hashimoto's thyroiditis with multiple thyroid nodules for follow-up visit. She was diagnosed with hypothyroidism about 19 years ago and was placed on thyroid hormone. She is on thyroid hormone for replacement purposes and currently takes Synthroid brand 137 mcg 1 tablet 6 days a week and none on 1 day a week. Weight is 12 pounds more than in the past.  She is somewhat hot and sweaty with hot flashes and then the chills. She denies cold intolerance or tremors. She has occasional palpitations unchanged. She has dry skin and brittle nails but no hair loss. She denies tremors, insomnia, diarrhea or constipation, anxiety or depression. Menstrual cycles are occurring more more sporadically with months that skip. She denies diplopia.   She denies compressive thyroid symptoms or difficulties with swallowing. She is a history of thyroid nodules which were biopsied several times in the past and her last ultrasound was last year with a new left-sided thyroid nodule in the setting of bilateral nodules that were previously stable. Observation was the treatment of choice. Last thyroid ultrasound was 2022 with just heterogeneous echotexture and resolution of her thyroid nodules. Review of Systems   Constitutional: Positive for fatigue. Negative for unexpected weight change. Weight up 12 lbs after lost weight. HENT: Negative for trouble swallowing. Eyes: Negative for visual disturbance. No diplopia. Respiratory: Negative for chest tightness and shortness of breath. Cardiovascular: Positive for palpitations. Negative for chest pain. Occasional unchanged palpitations. Gastrointestinal: Negative for abdominal pain, constipation, diarrhea and nausea. Endocrine: Positive for heat intolerance. Negative for cold intolerance. Lots of hot flashes and then chills. Genitourinary:        Still getting some menses sporadically, can have every moth or none for months. Skin: Negative for rash. Has dry skin. Has brittle nails. No hair loss. Neurological: Positive for dizziness. Negative for tremors, light-headedness and headaches. Dizzy at times. Often when dehydrated. Psychiatric/Behavioral: Negative for dysphoric mood and sleep disturbance. The patient is not nervous/anxious. Historical Information   Past Medical History:   Diagnosis Date   • Bulging lumbar disc     L4-5   • Hypertension     seeing cardiology   • Hypothyroidism     seeing Endocrinology, Dr. Raisa Blum   • MVP (mitral valve prolapse)      Past Surgical History:   Procedure Laterality Date   •  SECTION      X 3 (, , & )   • KNEE ARTHROTOMY Left    • [address]   • MAMMO (HISTORICAL) Bilateral 2020    [facility] BI-RADS 1. Negative . Heterogenously dense; 50% to 75% glandular breast tissue   • TONSILLECTOMY       Social History   Social History     Substance and Sexual Activity   Alcohol Use Yes   • Alcohol/week: 3.0 standard drinks of alcohol   • Types: 3 Standard drinks or equivalent per week    Comment: Socially - not every week     Social History     Substance and Sexual Activity   Drug Use No     Social History     Tobacco Use   Smoking Status Former   • Packs/day: 0.10   • Years: 8.00   • Total pack years: 0.80   • Types: Cigarettes   • Quit date: 1999   • Years since quittin.5   Smokeless Tobacco Never     Family History:   Family History   Problem Relation Age of Onset   • Hypothyroidism Mother    • Vascular Disease Mother         carotid artery surgery   • Thyroid disease Mother    • Hypertension Father    • Stroke Father    • No Known Problems Brother    • Testicular cancer Brother    • No Known Problems Daughter    • No Known Problems Daughter    • No Known Problems Son    • Dementia Maternal Grandmother    • No Known Problems Maternal Grandfather    • Stroke Paternal Grandmother    • No Known Problems Paternal Grandfather    • Thyroid disease unspecified Paternal Aunt    • Breast cancer Paternal Aunt    • Stomach cancer Paternal Aunt    • Stroke Paternal Uncle    • Stroke Maternal Aunt    • Lung cancer Maternal Uncle        Meds/Allergies   Current Outpatient Medications   Medication Sig Dispense Refill   • levothyroxine 137 mcg tablet TAKE 1 TABLET 6 DAYS A WEEK AND NO TABLET 1 DAY A WEEK 78 tablet 3   • losartan (COZAAR) 25 mg tablet Take 25 mg by mouth daily     • Multiple Vitamins-Minerals (MULTIVITAMIN WOMEN 50+) TABS Take by mouth daily       No current facility-administered medications for this visit. No Known Allergies    Objective   Vitals: Blood pressure 138/82, pulse 78, height 5' 5.25" (1.657 m), weight 73.9 kg (163 lb). Invasive Devices     None                 Physical Exam  Vitals reviewed. Constitutional:       Appearance: Normal appearance. She is well-developed. HENT:      Head: Normocephalic and atraumatic. Eyes:      Extraocular Movements: Extraocular movements intact. Conjunctiva/sclera: Conjunctivae normal.      Comments: No lid lag, stare, proptosis, or periorbital edema. Neck:      Thyroid: No thyromegaly. Vascular: No carotid bruit. Comments: Thyroid normal in size. No palpable thyroid nodules. Cardiovascular:      Rate and Rhythm: Normal rate and regular rhythm. Heart sounds: Normal heart sounds. No murmur heard. Pulmonary:      Effort: Pulmonary effort is normal.      Breath sounds: Normal breath sounds. No wheezing. Abdominal:      Palpations: Abdomen is soft. Musculoskeletal:         General: No deformity. Normal range of motion. Cervical back: Normal range of motion and neck supple. Right lower leg: No edema. Left lower leg: No edema. Comments: No tremor of the outstretched hands. Lymphadenopathy:      Cervical: No cervical adenopathy. Skin:     General: Skin is warm and dry. Findings: No rash. Neurological:      Mental Status: She is alert and oriented to person, place, and time. Deep Tendon Reflexes: Reflexes are normal and symmetric. Comments: Patellar deep tendon reflexes normal.         The history was obtained from the review of the chart and from the patient.     Lab Results:      Blood work performed on 7/3/2023 demonstrates the following results:    Lab Results   Component Value Date    TSH 0.724 07/03/2023    FREET4 1.49 07/03/2023             Future Appointments   Date Time Provider [address] [place] [address]   9/1/2023  2:40 PM Harmony Guerrero MD [facility]   7/10/2024 10:00 AM Salima Ivy MD [facility]

## 2023-07-10 NOTE — PATIENT INSTRUCTIONS
The thyroid blood work is normal.    Continue the same levothyroxine dosage. Follow up in 1 year with blood work.

## 2024-03-07 ENCOUNTER — ANNUAL EXAM (OUTPATIENT)
Dept: OBGYN CLINIC | Facility: CLINIC | Age: 54
End: 2024-03-07
Payer: COMMERCIAL

## 2024-03-07 VITALS
SYSTOLIC BLOOD PRESSURE: 122 MMHG | HEIGHT: 65 IN | DIASTOLIC BLOOD PRESSURE: 60 MMHG | BODY MASS INDEX: 28.36 KG/M2 | WEIGHT: 170.2 LBS

## 2024-03-07 DIAGNOSIS — Z80.3 FAMILY HISTORY OF BREAST CANCER IN FEMALE: ICD-10-CM

## 2024-03-07 DIAGNOSIS — E06.3 HYPOTHYROIDISM DUE TO HASHIMOTO'S THYROIDITIS: ICD-10-CM

## 2024-03-07 DIAGNOSIS — N92.6 IRREGULAR BLEEDING: ICD-10-CM

## 2024-03-07 DIAGNOSIS — Z12.39 BREAST CANCER SCREENING OTHER THAN MAMMOGRAM: ICD-10-CM

## 2024-03-07 DIAGNOSIS — R61 NIGHT SWEATS: ICD-10-CM

## 2024-03-07 DIAGNOSIS — R92.2 DENSE BREASTS: ICD-10-CM

## 2024-03-07 DIAGNOSIS — R63.5 WEIGHT GAIN: ICD-10-CM

## 2024-03-07 DIAGNOSIS — R92.30 DENSE BREASTS: ICD-10-CM

## 2024-03-07 DIAGNOSIS — Z01.419 ENCOUNTER FOR GYNECOLOGICAL EXAMINATION WITHOUT ABNORMAL FINDING: Primary | ICD-10-CM

## 2024-03-07 DIAGNOSIS — Z12.31 BREAST CANCER SCREENING BY MAMMOGRAM: ICD-10-CM

## 2024-03-07 DIAGNOSIS — E03.8 HYPOTHYROIDISM DUE TO HASHIMOTO'S THYROIDITIS: ICD-10-CM

## 2024-03-07 DIAGNOSIS — Z12.4 SCREENING FOR CERVICAL CANCER: ICD-10-CM

## 2024-03-07 DIAGNOSIS — N95.1 MENOPAUSAL SYMPTOMS: ICD-10-CM

## 2024-03-07 PROCEDURE — S0612 ANNUAL GYNECOLOGICAL EXAMINA: HCPCS | Performed by: OBSTETRICS & GYNECOLOGY

## 2024-03-07 RX ORDER — OMEPRAZOLE 40 MG/1
40 CAPSULE, DELAYED RELEASE ORAL AS NEEDED
COMMUNITY
Start: 2023-11-30

## 2024-03-07 RX ORDER — LOSARTAN POTASSIUM 50 MG/1
50 TABLET ORAL DAILY
COMMUNITY
Start: 2023-12-04

## 2024-03-07 NOTE — PROGRESS NOTES
[facility] - [address], [address], [place] [address]    ASSESSMENT/PLAN: Mile Iglesias is a 53 y.o.  who presents for annual gynecologic exam.    Encounter for routine gynecologic examination  - Routine well woman exam completed today.  - Cervical Cancer Screening: Current ASCCP Guidelines reviewed. Last Pap: 2018 . Next Pap Due: today   - COVID vaccine  x 3  pfizer     - Contraceptive counseling discussed.  Current contraception: none or condoms:   - Breast Cancer Screening: Last Mammogram Not on file,  Ofder given     - Colorectal cancer screening was not ordered.  - The following were reviewed in today's visit: breast self exam, mammography screening ordered, use and side effects of HRT, menopause, adequate intake of calcium and vitamin D, exercise, healthy diet, and colonoscopy discussed and ordered    Additional problems addressed during this visit:  1. Encounter for gynecological examination without abnormal finding    2. Family history of breast cancer in female  -     Mammo screening bilateral w 3d & cad; Future  -     IGP, Aptima HPV, Rfx 16/18,45  -     US breast screening bilateral complete (ABUS); Future    3. Breast cancer screening by mammogram  -     Mammo screening bilateral w 3d & cad; Future  -     IGP, Aptima HPV, Rfx 16/18,45    4. Screening for cervical cancer    5. Hypothyroidism due to Hashimoto's thyroiditis    6. Menopausal symptoms  Comments:  Seen 2023 w irregualr menses  wt gain  night sweats.  Hives w menses    7. Dense breasts  Comments:  Hx of call backs, ordered  b/l breast  us pt to check on coverage  Orders:  -     US breast screening bilateral complete (ABUS); Future    8. Breast cancer screening other than mammogram  -     US breast screening bilateral complete (ABUS); Future    9. Night sweats    10. Irregular bleeding  Comments:  last period  < 21 d from d1 to day 1.  Can go  6 weeks w no menses  flow waxs and wanes  Orders:  -      US pelvis complete w transvaginal; Future    11. Weight gain        CC:  Annual Gynecologic Examination    HPI: Mile Iglesias is a 53 y.o.  who presents for annual gynecologic examination.  54 yo  here for wellness.  Hx of c/s x 3.  + menses   are all over the place  can go  18-42 d  flow can be  light  and heavy off an on. + hives  past 4 menses  before and  w menses.  Mother did same thing. Uses benadryl.   + night sweats  no dryness. No hx of abn pa smears .       The following portions of the patient's history were reviewed and updated as appropriate: She  has a past medical history of Bulging lumbar disc, Hypertension (), Hypothyroidism, and MVP (mitral valve prolapse).  She  has a past surgical history that includes Knee arthrotomy (Left, );  section; Tonsillectomy (); Laparoscopic cholecystectomy (); and Mammo (historical) (Bilateral, 2020).  Her family history includes Breast cancer in her paternal aunt; Dementia in her maternal grandmother; Hypertension in her father; Hypothyroidism in her mother; Lung cancer in her maternal uncle; No Known Problems in her brother, daughter, daughter, maternal grandfather, paternal grandfather, and son; Stomach cancer in her paternal aunt; Stroke in her father, maternal aunt, paternal grandmother, and paternal uncle; Testicular cancer in her brother; Thyroid disease in her mother; Thyroid disease unspecified in her paternal aunt; Vascular Disease in her mother.  She  reports that she quit smoking about 25 years ago. Her smoking use included cigarettes. She started smoking about 33 years ago. She has a 0.8 pack-year smoking history. She has never used smokeless tobacco. She reports current alcohol use of about 3.0 standard drinks of alcohol per week. She reports that she does not use drugs.  Current Outpatient Medications   Medication Sig Dispense Refill   • levothyroxine 137 mcg tablet TAKE 1 TABLET 6 DAYS A WEEK AND NO TABLET 1 DAY  "A WEEK 78 tablet 3   • losartan (COZAAR) 50 mg tablet Take 50 mg by mouth daily     • Multiple Vitamins-Minerals (MULTIVITAMIN WOMEN 50+) TABS Take by mouth daily     • omeprazole (PriLOSEC) 40 MG capsule Take 40 mg by mouth if needed       No current facility-administered medications for this visit.     She is allergic to mixed ragweed..    Review of Systems:  All systems normal except as noted in HPI          Objective:  /60 (BP Location: Left arm, Patient Position: Sitting, Cuff Size: Large)   Ht 5' 5\" (1.651 m)   Wt 77.2 kg (170 lb 3.2 oz)   LMP 02/27/2024 (Exact Date)   BMI 28.32 kg/m²    Physical Exam  Vitals and nursing note reviewed.   Constitutional:       Appearance: Normal appearance.   HENT:      Head: Normocephalic.   Cardiovascular:      Rate and Rhythm: Normal rate and regular rhythm.      Pulses: Normal pulses.      Heart sounds: Normal heart sounds.   Pulmonary:      Effort: Pulmonary effort is normal.      Breath sounds: Normal breath sounds.   Chest:      Chest wall: No mass, lacerations, swelling, tenderness or edema.   Breasts:     Luis Score is 4.      Breasts are symmetrical.      Right: Normal. No swelling, bleeding, inverted nipple, mass, nipple discharge, skin change or tenderness.      Left: No swelling, bleeding, inverted nipple, mass, nipple discharge, skin change or tenderness.   Abdominal:      General: Abdomen is flat. Bowel sounds are normal.      Palpations: Abdomen is soft.   Genitourinary:     General: Normal vulva.      Exam position: Lithotomy position.      Pubic Area: No rash.       Luis stage (genital): 4.      Labia:         Right: No rash, tenderness or lesion.         Left: No rash, tenderness or lesion.       Urethra: No urethral pain, urethral swelling or urethral lesion.      Vagina: Normal.      Cervix: No cervical motion tenderness or discharge.      Uterus: Normal.       Adnexa: Right adnexa normal and left adnexa normal.      Rectum: Normal. "   Musculoskeletal:         General: Normal range of motion.      Cervical back: Neck supple.   Lymphadenopathy:      Upper Body:      Right upper body: No supraclavicular, axillary or pectoral adenopathy.      Left upper body: No supraclavicular, axillary or pectoral adenopathy.      Lower Body: No right inguinal adenopathy. No left inguinal adenopathy.   Skin:     General: Skin is warm and dry.   Neurological:      General: No focal deficit present.      Mental Status: She is alert and oriented to person, place, and time.   Psychiatric:         Mood and Affect: Mood normal.         Behavior: Behavior normal.         Thought Content: Thought content normal.         Judgment: Judgment normal.

## 2024-03-12 LAB
CYTOLOGIST CVX/VAG CYTO: NORMAL
DX ICD CODE: NORMAL
HPV GENOTYPE REFLEX: NORMAL
HPV I/H RISK 4 DNA CVX QL PROBE+SIG AMP: NEGATIVE
OTHER STN SPEC: NORMAL
PATH REPORT.FINAL DX SPEC: NORMAL
SL AMB NOTE:: NORMAL
SL AMB SPECIMEN ADEQUACY: NORMAL
SL AMB TEST METHODOLOGY: NORMAL

## 2024-03-27 ENCOUNTER — HOSPITAL ENCOUNTER (OUTPATIENT)
Dept: RADIOLOGY | Facility: HOSPITAL | Age: 54
Discharge: HOME | End: 2024-03-27
Payer: COMMERCIAL

## 2024-03-27 DIAGNOSIS — N92.6 IRREGULAR BLEEDING: ICD-10-CM

## 2024-03-27 PROCEDURE — 76830 TRANSVAGINAL US NON-OB: CPT

## 2024-03-27 PROCEDURE — 76856 US EXAM PELVIC COMPLETE: CPT

## 2024-05-01 PROBLEM — Z12.4 SCREENING FOR CERVICAL CANCER: Status: RESOLVED | Noted: 2024-03-07 | Resolved: 2024-05-01

## 2024-05-01 PROBLEM — Z12.39 BREAST CANCER SCREENING OTHER THAN MAMMOGRAM: Status: RESOLVED | Noted: 2024-03-07 | Resolved: 2024-05-01

## 2024-05-01 PROBLEM — Z01.419 ENCOUNTER FOR GYNECOLOGICAL EXAMINATION WITHOUT ABNORMAL FINDING: Status: RESOLVED | Noted: 2024-03-07 | Resolved: 2024-05-01

## 2024-06-12 DIAGNOSIS — E03.9 HYPOTHYROIDISM, UNSPECIFIED TYPE: ICD-10-CM

## 2024-06-12 RX ORDER — LEVOTHYROXINE SODIUM 137 UG/1
TABLET ORAL
Qty: 78 TABLET | Refills: 3 | Status: SHIPPED | OUTPATIENT
Start: 2024-06-12

## 2024-06-26 LAB
T4 FREE SERPL-MCNC: 1.53 NG/DL (ref 0.82–1.77)
TSH SERPL DL<=0.005 MIU/L-ACNC: 0.62 UIU/ML (ref 0.45–4.5)

## 2024-07-10 ENCOUNTER — OFFICE VISIT (OUTPATIENT)
Dept: ENDOCRINOLOGY | Facility: HOSPITAL | Age: 54
End: 2024-07-10
Payer: COMMERCIAL

## 2024-07-10 VITALS
BODY MASS INDEX: 28.62 KG/M2 | WEIGHT: 171.8 LBS | HEIGHT: 65 IN | DIASTOLIC BLOOD PRESSURE: 78 MMHG | HEART RATE: 67 BPM | SYSTOLIC BLOOD PRESSURE: 120 MMHG

## 2024-07-10 DIAGNOSIS — E06.3 HYPOTHYROIDISM DUE TO HASHIMOTO'S THYROIDITIS: Primary | ICD-10-CM

## 2024-07-10 DIAGNOSIS — E04.2 MULTIPLE THYROID NODULES: ICD-10-CM

## 2024-07-10 DIAGNOSIS — E03.8 HYPOTHYROIDISM DUE TO HASHIMOTO'S THYROIDITIS: Primary | ICD-10-CM

## 2024-07-10 PROCEDURE — 99214 OFFICE O/P EST MOD 30 MIN: CPT | Performed by: INTERNAL MEDICINE

## 2024-07-10 NOTE — PROGRESS NOTES
7/10/2024    Assessment & Plan      Diagnoses and all orders for this visit:    Hypothyroidism due to Hashimoto's thyroiditis  -     T4, free; Future  -     TSH, 3rd generation; Future  -     T4, free  -     TSH, 3rd generation    Multiple thyroid nodules  -     T4, free; Future  -     TSH, 3rd generation; Future  -     T4, free  -     TSH, 3rd generation        Assessment & Plan  1. Hypothyroidism due to Hashimoto's thyroiditis.  The patient's most recent thyroid function studies have returned normal results, indicating biochemically and clinically euthyroid status. A discussion was held regarding the patient's weight issues, given her recent weight gain over the past year, which is likely perimenopausal in nature. She will persist with dietary changes and exercise.    2. Multiple thyroid nodules.  The most recent thyroid ultrasound conducted in 2022 revealed resolution of the nodules. No compressive thyroid symptoms have been reported, and palpation reveals no evidence of thyroid nodules.    Follow-up  A follow-up visit is scheduled for 1 year from now, during which TSH and free T4 tests will be conducted.    CC: Hypothyroid, thyroid nodule follow-up    History of Present Illness    HPI: Mile Iglesias is a 53-year-old female with history of hypothyroidism due to Hashimoto's thyroiditis with multiple thyroid nodules in the past, here for follow-up visit.    She was diagnosed with hypothyroidism about 20 years ago and was placed on thyroid hormone. The patient is currently on a regimen of levothyroxine 137 mcg, administered six days a week, without adherence on Wednesdays. She reports no symptoms of excessive heat or cold intolerance, tachycardia, palpitations, tremors, hand tremors, diarrhea, constipation, dry skin, nail breakage, or hair loss. Her sleep pattern is irregular, which she attributes to perimenopause. Morning fatigue is particularly challenging, but she manages to remain active throughout the day.  She denies experiencing anxiety or depression, diplopia, or dysphagia. Her menstrual cycles occur every 3 to 8 weeks.    She is a history of thyroid nodules which were biopsied several times in the past and her last ultrasound was last year with a new left-sided thyroid nodule in the setting of bilateral nodules that were previously stable.  Observation was the treatment of choice.  Last thyroid ultrasound was 2022 with just heterogeneous echotexture and resolution of her thyroid nodules.     Historical Information   Past Medical History:   Diagnosis Date    Bulging lumbar disc     L4-5    Hypertension     seeing cardiology    Hypothyroidism     seeing Endocrinology, Dr. Dela Cruz    MVP (mitral valve prolapse)      Past Surgical History:   Procedure Laterality Date     SECTION      X 3 (, , & )    KNEE ARTHROTOMY Left     LAPAROSCOPIC CHOLECYSTECTOMY      MAMMO (HISTORICAL) Bilateral 2020    [facility] BI-RADS 1. Negative . Heterogenously dense; 50% to 75% glandular breast tissue    TONSILLECTOMY       Social History   Social History     Substance and Sexual Activity   Alcohol Use Yes    Alcohol/week: 3.0 standard drinks of alcohol    Types: 3 Standard drinks or equivalent per week    Comment: Socially - not every week     Social History     Substance and Sexual Activity   Drug Use No     Social History     Tobacco Use   Smoking Status Former    Current packs/day: 0.00    Average packs/day: 0.1 packs/day for 8.0 years (0.8 ttl pk-yrs)    Types: Cigarettes    Start date: 1991    Quit date: 1999    Years since quittin.5   Smokeless Tobacco Never     Family History:   Family History   Problem Relation Age of Onset    Hypothyroidism Mother     Vascular Disease Mother         carotid artery surgery    Thyroid disease Mother     Hypertension Father     Stroke Father     No Known Problems Brother     Testicular cancer Brother     Dementia Maternal Grandmother     No Known  "Problems Maternal Grandfather     Stroke Paternal Grandmother     No Known Problems Paternal Grandfather     No Known Problems Daughter     No Known Problems Daughter     No Known Problems Son     Stroke Maternal Aunt     Lung cancer Maternal Uncle     Thyroid disease unspecified Paternal Aunt     Breast cancer Paternal Aunt     Stomach cancer Paternal Aunt     Stroke Paternal Uncle     Ovarian cancer Neg Hx     Endometrial cancer Neg Hx        Meds/Allergies   Current Outpatient Medications   Medication Sig Dispense Refill    levothyroxine 137 mcg tablet TAKE 1 TABLET 6 DAYS A WEEK AND NO TABLET 1 DAY A WEEK 78 tablet 3    losartan (COZAAR) 50 mg tablet Take 50 mg by mouth daily      omeprazole (PriLOSEC) 40 MG capsule Take 40 mg by mouth if needed       No current facility-administered medications for this visit.     Allergies   Allergen Reactions    Mixed Ragweed Eye Swelling, Facial Swelling, Itching and Nasal Congestion       Objective   Vitals: Blood pressure 120/78, pulse 67, height 5' 5\" (1.651 m), weight 77.9 kg (171 lb 12.8 oz).  Invasive Devices       None                   Physical Exam  Physical exam normal with pertinent positives and negatives.    No lid lag, stare, proptosis, or periorbital edema.  Thyroid is normal in size with a regular in field, but no discrete nodules are palpable in the neck.  Lungs are clear to auscultation.  Heart has a regular rate and rhythm. No murmurs.  No tremor in the outstretched hands in the musculoskeletal system.  Neurologic examination reveals patellar deep tendon reflexes are normal.      The history was obtained from the review of the chart and from the patient.    Lab Results:      Blood work performed on 6/25/2024 demonstrates the following results:    Lab Results   Component Value Date    TSH 0.621 06/25/2024    FREET4 1.53 06/25/2024         Future Appointments   Date Time Provider Department Center   10/4/2024  1:00 PM BE MAMMO RBC 3 BE RBC Mammo BE RBC "   10/4/2024  1:45 PM BE ABUS RBC 1 BE RBC US BE RBC   3/20/2025  1:00 PM BATOOL [facility]na [facility]   7/10/2025  3:00 PM Lennie Dela Cruz MD [facility]

## 2024-07-10 NOTE — PATIENT INSTRUCTIONS
The thyroid blood work is normal.     Continue the same levothyroxine 137 mcg daily.     Follow up in 1 year with blood work.

## 2024-10-04 ENCOUNTER — HOSPITAL ENCOUNTER (OUTPATIENT)
Dept: ULTRASOUND IMAGING | Facility: CLINIC | Age: 54
Discharge: HOME | End: 2024-10-04
Payer: COMMERCIAL

## 2024-10-04 ENCOUNTER — HOSPITAL ENCOUNTER (OUTPATIENT)
Dept: MAMMOGRAPHY | Facility: CLINIC | Age: 54
Discharge: HOME | End: 2024-10-04
Payer: COMMERCIAL

## 2024-10-04 VITALS — HEIGHT: 65 IN | WEIGHT: 171 LBS | BODY MASS INDEX: 28.49 KG/M2

## 2024-10-04 DIAGNOSIS — Z12.31 BREAST CANCER SCREENING BY MAMMOGRAM: ICD-10-CM

## 2024-10-04 DIAGNOSIS — Z80.3 FAMILY HISTORY OF BREAST CANCER IN FEMALE: ICD-10-CM

## 2024-10-04 DIAGNOSIS — Z12.39 BREAST CANCER SCREENING OTHER THAN MAMMOGRAM: ICD-10-CM

## 2024-10-04 DIAGNOSIS — R92.30 DENSE BREASTS: ICD-10-CM

## 2024-10-04 PROCEDURE — 77063 BREAST TOMOSYNTHESIS BI: CPT

## 2024-10-04 PROCEDURE — 76641 ULTRASOUND BREAST COMPLETE: CPT

## 2024-10-04 PROCEDURE — 77067 SCR MAMMO BI INCL CAD: CPT

## 2024-11-11 ENCOUNTER — HOSPITAL ENCOUNTER (EMERGENCY)
Dept: HOSPITAL 99 - EMR | Age: 54
Discharge: HOME | End: 2024-11-11
Payer: COMMERCIAL

## 2024-11-11 VITALS — RESPIRATION RATE: 14 BRPM | SYSTOLIC BLOOD PRESSURE: 136 MMHG | DIASTOLIC BLOOD PRESSURE: 78 MMHG

## 2024-11-11 VITALS — RESPIRATION RATE: 14 BRPM

## 2024-11-11 VITALS — RESPIRATION RATE: 20 BRPM

## 2024-11-11 VITALS — RESPIRATION RATE: 17 BRPM

## 2024-11-11 VITALS — DIASTOLIC BLOOD PRESSURE: 75 MMHG | SYSTOLIC BLOOD PRESSURE: 140 MMHG | RESPIRATION RATE: 20 BRPM

## 2024-11-11 VITALS — RESPIRATION RATE: 14 BRPM | SYSTOLIC BLOOD PRESSURE: 144 MMHG | DIASTOLIC BLOOD PRESSURE: 76 MMHG

## 2024-11-11 VITALS — RESPIRATION RATE: 13 BRPM

## 2024-11-11 VITALS — RESPIRATION RATE: 18 BRPM | SYSTOLIC BLOOD PRESSURE: 171 MMHG | DIASTOLIC BLOOD PRESSURE: 106 MMHG

## 2024-11-11 VITALS — RESPIRATION RATE: 11 BRPM

## 2024-11-11 VITALS — RESPIRATION RATE: 16 BRPM

## 2024-11-11 VITALS — BODY MASS INDEX: 29 KG/M2

## 2024-11-11 DIAGNOSIS — Z90.49: ICD-10-CM

## 2024-11-11 DIAGNOSIS — K21.9: ICD-10-CM

## 2024-11-11 DIAGNOSIS — R00.2: Primary | ICD-10-CM

## 2024-11-11 DIAGNOSIS — I10: ICD-10-CM

## 2024-11-11 DIAGNOSIS — E78.00: ICD-10-CM

## 2024-11-11 DIAGNOSIS — E03.9: ICD-10-CM

## 2024-11-11 LAB
ALBUMIN SERPL-MCNC: 3.8 G/DL (ref 3.5–5)
ALP SERPL-CCNC: 56 U/L (ref 38–126)
ALT SERPL-CCNC: 18 U/L (ref 0–35)
APTT PPP: 30.8 SEC (ref 23.4–35)
AST SERPL-CCNC: 22 U/L (ref 14–36)
BUN SERPL-MCNC: 14 MG/DL (ref 7–17)
CALCIUM SERPL-MCNC: 9.2 MG/DL (ref 8.4–10.2)
CHLORIDE SERPL-SCNC: 108 MMOL/L (ref 98–107)
CO2 SERPL-SCNC: 24 MMOL/L (ref 22–30)
EGFR: > 60
ERYTHROCYTE [DISTWIDTH] IN BLOOD BY AUTOMATED COUNT: 15.4 % (ref 11.5–14.5)
ESTIMATED CREATININE CLEARANCE: 86 ML/MIN
GLUCOSE SERPL-MCNC: 112 MG/DL (ref 70–99)
HCT VFR BLD AUTO: 35 % (ref 37–47)
HGB BLD-MCNC: 11.9 G/DL (ref 12–16)
INR PPP: 0.93
MCHC RBC AUTO-ENTMCNC: 34 G/DL (ref 33–37)
MCV RBC AUTO: 80.3 FL (ref 81–99)
NRBC BLD AUTO-RTO: 0 %
PLATELET # BLD AUTO: 263 10^3/UL (ref 130–400)
POTASSIUM SERPL-SCNC: 4 MMOL/L (ref 3.5–5.1)
PROT SERPL-MCNC: 6.2 G/DL (ref 6.3–8.2)
PROTHROMBIN TIME: 12.8 SEC (ref 11.4–14.6)
SODIUM SERPL-SCNC: 141 MMOL/L (ref 135–145)
TROPONIN I SERPL-MCNC: < 0.012 NG/ML
TSH SERPL DL<=0.05 MIU/L-ACNC: 4.1 UIU/ML (ref 0.47–4.68)

## 2024-11-11 PROCEDURE — 99284 EMERGENCY DEPT VISIT MOD MDM: CPT

## 2025-03-20 ENCOUNTER — ANNUAL EXAM (OUTPATIENT)
Dept: OBGYN CLINIC | Facility: CLINIC | Age: 55
End: 2025-03-20
Payer: COMMERCIAL

## 2025-03-20 VITALS
HEIGHT: 65 IN | DIASTOLIC BLOOD PRESSURE: 76 MMHG | SYSTOLIC BLOOD PRESSURE: 116 MMHG | BODY MASS INDEX: 29.16 KG/M2 | WEIGHT: 175 LBS

## 2025-03-20 DIAGNOSIS — Z12.39 BREAST CANCER SCREENING OTHER THAN MAMMOGRAM: ICD-10-CM

## 2025-03-20 DIAGNOSIS — N95.1 MENOPAUSAL SYMPTOMS: ICD-10-CM

## 2025-03-20 DIAGNOSIS — Z12.31 BREAST CANCER SCREENING BY MAMMOGRAM: ICD-10-CM

## 2025-03-20 DIAGNOSIS — R61 NIGHT SWEATS: ICD-10-CM

## 2025-03-20 DIAGNOSIS — R92.30 DENSE BREASTS: ICD-10-CM

## 2025-03-20 DIAGNOSIS — E06.3 HYPOTHYROIDISM DUE TO HASHIMOTO'S THYROIDITIS: ICD-10-CM

## 2025-03-20 DIAGNOSIS — N92.6 IRREGULAR BLEEDING: ICD-10-CM

## 2025-03-20 DIAGNOSIS — Z01.419 ENCOUNTER FOR GYNECOLOGICAL EXAMINATION WITHOUT ABNORMAL FINDING: Primary | ICD-10-CM

## 2025-03-20 DIAGNOSIS — Z80.3 FAMILY HISTORY OF BREAST CANCER IN FEMALE: ICD-10-CM

## 2025-03-20 PROCEDURE — S0612 ANNUAL GYNECOLOGICAL EXAMINA: HCPCS | Performed by: OBSTETRICS & GYNECOLOGY

## 2025-03-20 RX ORDER — FEXOFENADINE HCL 180 MG/1
TABLET ORAL
COMMUNITY
Start: 2024-01-02

## 2025-03-20 RX ORDER — ROSUVASTATIN CALCIUM 5 MG/1
5 TABLET, COATED ORAL
COMMUNITY
Start: 2024-05-27

## 2025-03-20 NOTE — PROGRESS NOTES
[facility] - [address], [address], [place] [address]    ASSESSMENT/PLAN: Mile Iglesias is a 54 y.o.  who presents for annual gynecologic exam.    Encounter for routine gynecologic examination  - Routine well woman exam completed today.  - Cervical Cancer Screening: Current ASCCP Guidelines reviewed. Last Pap: 2024 . Next Pap Due:   - Contraceptive counseling discussed.  Current contraception: : vasectomy   - Breast Cancer Screening: Last Mammogram 10/04/2024,    Additional problems addressed during this visit:  1. Family history of breast cancer in female  -     Mammo screening bilateral w 3d and cad; Future  2. Breast cancer screening by mammogram  -     Mammo screening bilateral w 3d and cad; Future  3. Encounter for gynecological examination without abnormal finding  4. Dense breasts  -     US breast screening bilateral complete (ABUS); Future  5. Breast cancer screening other than mammogram  6. Night sweats  7. Irregular bleeding  8. Hypothyroidism due to Hashimoto's thyroiditis  9. Menopausal symptoms  -     US breast screening bilateral complete (ABUS); Future      CC:  Annual Gynecologic Examination    HPI: Mile Iglesias is a 54 y.o.  who presents for annual gynecologic examination.  HPI    The following portions of the patient's history were reviewed and updated as appropriate: She  has a past medical history of Bulging lumbar disc, Hypertension (), Hypothyroidism, and MVP (mitral valve prolapse).  She  has a past surgical history that includes Knee arthrotomy (Left, );  section; Tonsillectomy (); Laparoscopic cholecystectomy (); and Mammo (historical) (Bilateral, 10/2024).  Her family history includes Breast cancer in her paternal aunt; Dementia in her maternal grandmother; Hypertension in her father; Hypothyroidism in her mother; Lung cancer in her maternal uncle; No Known Problems in her brother, daughter, daughter, maternal  grandfather, paternal grandfather, and son; Stomach cancer in her paternal aunt; Stroke in her father, maternal aunt, paternal grandmother, and paternal uncle; Testicular cancer in her brother; Thyroid disease in her mother; Thyroid disease unspecified in her paternal aunt; Vascular Disease in her mother.  She  reports that she quit smoking about 26 years ago. Her smoking use included cigarettes. She started smoking about 34 years ago. She has a 0.8 pack-year smoking history. She has never used smokeless tobacco. She reports current alcohol use of about 3.0 standard drinks of alcohol per week. She reports that she does not use drugs.  Current Outpatient Medications   Medication Sig Dispense Refill   • fexofenadine (Allegra Allergy) 180 MG tablet      • levothyroxine 137 mcg tablet TAKE 1 TABLET 6 DAYS A WEEK AND NO TABLET 1 DAY A WEEK 78 tablet 3   • losartan (COZAAR) 50 mg tablet Take 50 mg by mouth daily 1 tablet in AM, 1/2 in PM , total 75mg daily     • omeprazole (PriLOSEC) 40 MG capsule Take 40 mg by mouth if needed     • rosuvastatin (CRESTOR) 5 mg tablet Take 5 mg by mouth 3 day a week       No current facility-administered medications for this visit.     She is allergic to mixed ragweed and epinephrine..    Review of Systems   Constitutional:  Negative for chills and fever.   HENT:  Negative for ear pain and sore throat.    Eyes:  Negative for pain and visual disturbance.   Respiratory:  Negative for cough and shortness of breath.    Cardiovascular:  Negative for chest pain and palpitations.   Gastrointestinal:  Negative for abdominal pain and vomiting.   Endocrine: Negative.    Genitourinary:  Negative for dysuria and hematuria.   Musculoskeletal:  Negative for arthralgias and back pain.   Skin:  Negative for color change and rash.   Allergic/Immunologic: Negative.    Neurological: Negative.  Negative for seizures and syncope.   Hematological: Negative.    Psychiatric/Behavioral: Negative.     All other  "systems reviewed and are negative.        Objective:  /76 (BP Location: Right arm, Patient Position: Sitting, Cuff Size: Standard)   Ht 5' 5\" (1.651 m)   Wt 79.4 kg (175 lb)   LMP 01/18/2025   BMI 29.12 kg/m²    Physical Exam  Vitals and nursing note reviewed.   Constitutional:       Appearance: Normal appearance.   HENT:      Head: Normocephalic.   Cardiovascular:      Rate and Rhythm: Normal rate and regular rhythm.      Pulses: Normal pulses.      Heart sounds: Normal heart sounds.   Pulmonary:      Effort: Pulmonary effort is normal.      Breath sounds: Normal breath sounds.   Chest:      Chest wall: No mass, lacerations, swelling, tenderness or edema.   Breasts:     Luis Score is 4.      Breasts are symmetrical.      Right: Normal. No swelling, bleeding, inverted nipple, mass, nipple discharge, skin change or tenderness.      Left: No swelling, bleeding, inverted nipple, mass, nipple discharge, skin change or tenderness.   Abdominal:      General: Abdomen is flat. Bowel sounds are normal.      Palpations: Abdomen is soft.   Genitourinary:     General: Normal vulva.      Exam position: Lithotomy position.      Pubic Area: No rash.       Luis stage (genital): 4.      Labia:         Right: No rash, tenderness or lesion.         Left: No rash, tenderness or lesion.       Urethra: No urethral pain, urethral swelling or urethral lesion.      Vagina: Normal.      Cervix: No cervical motion tenderness or discharge.      Uterus: Normal.       Adnexa: Right adnexa normal and left adnexa normal.      Rectum: Normal.   Musculoskeletal:         General: Normal range of motion.      Cervical back: Normal range of motion and neck supple.   Lymphadenopathy:      Upper Body:      Right upper body: No supraclavicular, axillary or pectoral adenopathy.      Left upper body: No supraclavicular, axillary or pectoral adenopathy.      Lower Body: No right inguinal adenopathy. No left inguinal adenopathy.   Skin:     " General: Skin is warm and dry.   Neurological:      General: No focal deficit present.      Mental Status: She is alert and oriented to person, place, and time.   Psychiatric:         Mood and Affect: Mood normal.         Behavior: Behavior normal.         Thought Content: Thought content normal.         Judgment: Judgment normal.

## 2025-04-19 PROBLEM — Z12.39 BREAST CANCER SCREENING OTHER THAN MAMMOGRAM: Status: RESOLVED | Noted: 2025-03-20 | Resolved: 2025-04-19

## 2025-04-19 PROBLEM — Z01.419 ENCOUNTER FOR GYNECOLOGICAL EXAMINATION WITHOUT ABNORMAL FINDING: Status: RESOLVED | Noted: 2025-03-20 | Resolved: 2025-04-19

## 2025-06-09 DIAGNOSIS — E03.9 HYPOTHYROIDISM, UNSPECIFIED TYPE: ICD-10-CM

## 2025-06-09 RX ORDER — LEVOTHYROXINE SODIUM 137 UG/1
TABLET ORAL
Qty: 78 TABLET | Refills: 0 | Status: SHIPPED | OUTPATIENT
Start: 2025-06-09

## 2025-07-04 LAB
T4 FREE SERPL-MCNC: 1.46 NG/DL (ref 0.82–1.77)
TSH SERPL DL<=0.005 MIU/L-ACNC: 0.52 UIU/ML (ref 0.45–4.5)

## 2025-07-10 ENCOUNTER — OFFICE VISIT (OUTPATIENT)
Dept: ENDOCRINOLOGY | Facility: HOSPITAL | Age: 55
End: 2025-07-10
Payer: COMMERCIAL

## 2025-07-10 VITALS
WEIGHT: 172.4 LBS | DIASTOLIC BLOOD PRESSURE: 80 MMHG | HEIGHT: 65 IN | BODY MASS INDEX: 28.72 KG/M2 | SYSTOLIC BLOOD PRESSURE: 120 MMHG | HEART RATE: 62 BPM

## 2025-07-10 DIAGNOSIS — E04.2 MULTIPLE THYROID NODULES: ICD-10-CM

## 2025-07-10 DIAGNOSIS — E06.3 HYPOTHYROIDISM DUE TO HASHIMOTO'S THYROIDITIS: Primary | ICD-10-CM

## 2025-07-10 DIAGNOSIS — E03.9 HYPOTHYROIDISM, UNSPECIFIED TYPE: ICD-10-CM

## 2025-07-10 PROCEDURE — 99213 OFFICE O/P EST LOW 20 MIN: CPT | Performed by: INTERNAL MEDICINE

## 2025-07-10 RX ORDER — LEVOTHYROXINE SODIUM 137 UG/1
TABLET ORAL
Qty: 78 TABLET | Refills: 3 | Status: SHIPPED | OUTPATIENT
Start: 2025-07-10

## 2025-07-10 NOTE — PROGRESS NOTES
Name: Mile Iglesias      : 1970      MRN: [id number]  Encounter Provider: Lennie Dela Cruz MD  Encounter Date: 7/10/2025   Encounter department: [facility]    No chief complaint on file.  :  Assessment & Plan  Hypothyroidism due to Hashimoto's thyroiditis    Orders:    T4, free; Future    TSH, 3rd generation; Future    Multiple thyroid nodules    Orders:    T4, free; Future    TSH, 3rd generation; Future    Hypothyroidism, unspecified type    Orders:    levothyroxine 137 mcg tablet; TAKE 1 TABLET 6 DAYS A WEEK AND NO TABLET 1 DAY A WEEK      Assessment & Plan  1. Hypothyroidism due to Hashimoto's thyroiditis: Stable.  - Reports no significant changes in symptoms such as hot flashes, tremors, heart palpitations, diarrhea, or constipation.  - Experiences regular bowel movements and no significant hair loss.  - Thyroid function tests are within normal limits.  - Discussed her current regimen of levothyroxine 137 mcg, one pill six days a week, and she feels the same as last year.  - Prescription refill for levothyroxine 137 mcg has been provided. Blood work has been ordered for next year to monitor her thyroid levels.    2.  Multiple thyroid nodules.  Thyroid exam unchanged.  No evidence that thyroid nodules have increased in size.  Will follow over time clinically.    I have asked her to follow-up in 1 year with preceding TSH and free T4.      Pertinent Medical History   Mile Iglesias  is a 54-year-old female with a history of hypothyroidism due to Hashimoto's thyroiditis and multiple thyroid nodules.     She is on thyroid hormone for replacement purposes. She was diagnosed with hypothyroidism about 21 years ago and was placed on thyroid hormone.     She is a history of thyroid nodules which were biopsied several times in the past and her last ultrasound was last year with a new left-sided thyroid nodule in the setting of bilateral nodules that were  "previously stable.  Observation was the treatment of choice.  Last thyroid ultrasound was June 2022 with just heterogeneous echotexture and resolution of her thyroid nodules.         History of Present Illness   History of Present Illness  Mile Iglesias  is a 54-year-old female with a history of hypothyroidism due to Hashimoto's thyroiditis and multiple thyroid nodules, here for a follow-up visit. She is on thyroid hormone for replacement purposes.    She reports no significant changes in her condition since the last visit. She does not experience excessive heat or cold sensations, tremors, or heart palpitations. Bowel movements are regular, and she has not noticed any changes in her skin or hair loss. However, she mentions that her nails are prone to breaking. Sleep patterns are inconsistent, with some good nights and some bad ones. Recently, she has been feeling more fatigued, which she attributes to the hot weather and reduced physical activity. Menstruation continues, with the last period occurring in 06/2025 after a gap of 4 months. She is currently taking levothyroxine 137 mcg, one pill six days a week.      Review of Systems as per HPI  Medical History Reviewed by provider this encounter:  Tobacco  Allergies  Meds  Problems  Med Hx  Surg Hx  Fam Hx     .  Medications Ordered Prior to Encounter[1]   Social History[2]     Medical History Reviewed by provider this encounter:  Tobacco  Allergies  Meds  Problems  Med Hx  Surg Hx  Fam Hx     .    Objective   /80   Pulse 62   Ht 5' 5\" (1.651 m)   Wt 78.2 kg (172 lb 6.4 oz)   BMI 28.69 kg/m²      Body mass index is 28.69 kg/m².  Wt Readings from Last 3 Encounters:   07/10/25 78.2 kg (172 lb 6.4 oz)   03/20/25 79.4 kg (175 lb)   10/04/24 77.6 kg (171 lb)     Physical Exam  Physical Exam  General: Alert and oriented, no acute distress.  HEENT: No lid lag, stare, proptosis, or periorbital edema.  Head and Neck: Thyroid gland palpation reveals " no significant enlargement or nodules.  Cardiovascular: Heart auscultation reveals regular rate and rhythm, no murmurs.  Respiratory: Lung auscultation reveals clear breath sounds bilaterally.  Extremities: No tremor of the outstretched hands.  Neurologic: Patellar deep tendon reflexes normal.    Results    Labs: I have reviewed pertinent labs including:     Blood work performed on 7/3/2025 showed a TSH of 0.519.    Lab Results   Component Value Date    FREET4 1.46 2025      Radiology Results Review : No pertinent imaging studies reviewed.  Patient Instructions   The thyroid blood work is normal.     Continue the same levothyroxine 137 mcg 1 tablet 6 days a week.     Follow up in 1 year with blood work.     Discussed with the patient and all questioned fully answered. She will call me if any problems arise.           [1]   Current Outpatient Medications on File Prior to Visit   Medication Sig Dispense Refill    fexofenadine (Allegra Allergy) 180 MG tablet       losartan (COZAAR) 50 mg tablet Take 50 mg by mouth in the morning. 1 tablet in AM, 1/2 in PM , total 75mg daily .      omeprazole (PriLOSEC) 40 MG capsule Take 40 mg by mouth if needed      rosuvastatin (CRESTOR) 5 mg tablet Take 5 mg by mouth 3 day a week      [DISCONTINUED] levothyroxine 137 mcg tablet TAKE 1 TABLET 6 DAYS A WEEK AND NO TABLET 1 DAY A WEEK 78 tablet 0     No current facility-administered medications on file prior to visit.   [2]   Social History  Tobacco Use    Smoking status: Former     Current packs/day: 0.00     Average packs/day: 0.1 packs/day for 8.0 years (0.8 ttl pk-yrs)     Types: Cigarettes     Start date: 1991     Quit date: 1999     Years since quittin.5    Smokeless tobacco: Never   Vaping Use    Vaping status: Never Used   Substance and Sexual Activity    Alcohol use: Yes     Alcohol/week: 3.0 standard drinks of alcohol     Types: 3 Standard drinks or equivalent per week     Comment: Socially - not every week     Drug use: No    Sexual activity: Yes     Partners: Male     Birth control/protection: Male Sterilization     Comment: no new partner in past year

## 2025-07-10 NOTE — PATIENT INSTRUCTIONS
The thyroid blood work is normal.     Continue the same levothyroxine 137 mcg 1 tablet 6 days a week.     Follow up in 1 year with blood work.